# Patient Record
Sex: FEMALE | URBAN - METROPOLITAN AREA
[De-identification: names, ages, dates, MRNs, and addresses within clinical notes are randomized per-mention and may not be internally consistent; named-entity substitution may affect disease eponyms.]

---

## 2019-07-31 ENCOUNTER — APPOINTMENT (RX ONLY)
Dept: URBAN - METROPOLITAN AREA CLINIC 42 | Facility: CLINIC | Age: 49
Setting detail: DERMATOLOGY
End: 2019-07-31

## 2019-07-31 DIAGNOSIS — L90.8 OTHER ATROPHIC DISORDERS OF SKIN: ICD-10-CM

## 2019-07-31 DIAGNOSIS — L57.0 ACTINIC KERATOSIS: ICD-10-CM

## 2019-07-31 DIAGNOSIS — L82.1 OTHER SEBORRHEIC KERATOSIS: ICD-10-CM

## 2019-07-31 DIAGNOSIS — D22 MELANOCYTIC NEVI: ICD-10-CM

## 2019-07-31 DIAGNOSIS — L81.4 OTHER MELANIN HYPERPIGMENTATION: ICD-10-CM

## 2019-07-31 PROBLEM — D22.5 MELANOCYTIC NEVI OF TRUNK: Status: ACTIVE | Noted: 2019-07-31

## 2019-07-31 PROBLEM — K21.9 GASTRO-ESOPHAGEAL REFLUX DISEASE WITHOUT ESOPHAGITIS: Status: ACTIVE | Noted: 2019-07-31

## 2019-07-31 PROBLEM — F32.9 MAJOR DEPRESSIVE DISORDER, SINGLE EPISODE, UNSPECIFIED: Status: ACTIVE | Noted: 2019-07-31

## 2019-07-31 PROCEDURE — 99214 OFFICE O/P EST MOD 30 MIN: CPT

## 2019-07-31 PROCEDURE — ? COUNSELING

## 2019-07-31 PROCEDURE — ? DEFER

## 2019-07-31 PROCEDURE — ? PHOTO-DOCUMENTATION

## 2019-07-31 PROCEDURE — ? ADDITIONAL NOTES

## 2019-07-31 ASSESSMENT — LOCATION DETAILED DESCRIPTION DERM
LOCATION DETAILED: MIDDLE STERNUM
LOCATION DETAILED: LEFT MEDIAL UPPER BACK
LOCATION DETAILED: NASAL DORSUM
LOCATION DETAILED: INFERIOR THORACIC SPINE

## 2019-07-31 ASSESSMENT — LOCATION SIMPLE DESCRIPTION DERM
LOCATION SIMPLE: CHEST
LOCATION SIMPLE: LEFT UPPER BACK
LOCATION SIMPLE: NOSE
LOCATION SIMPLE: UPPER BACK

## 2019-07-31 ASSESSMENT — LOCATION ZONE DERM
LOCATION ZONE: TRUNK
LOCATION ZONE: NOSE

## 2019-07-31 NOTE — PROCEDURE: ADDITIONAL NOTES
Detail Level: Simple
Additional Notes: Discussed in detail melanage peel r/b/s, mini melanage vs. vitalize and rejuvinize peels

## 2019-07-31 NOTE — PROCEDURE: PHOTO-DOCUMENTATION
Details (Free Text): L upper thigh monitor
Detail Level: Zone
Photo Preface (Leave Blank If You Do Not Want): Photographs were obtained today

## 2019-09-04 ENCOUNTER — APPOINTMENT (RX ONLY)
Dept: URBAN - METROPOLITAN AREA CLINIC 42 | Facility: CLINIC | Age: 49
Setting detail: DERMATOLOGY
End: 2019-09-04

## 2019-09-04 DIAGNOSIS — L72.0 EPIDERMAL CYST: ICD-10-CM

## 2019-09-04 DIAGNOSIS — L90.8 OTHER ATROPHIC DISORDERS OF SKIN: ICD-10-CM

## 2019-09-04 DIAGNOSIS — L57.0 ACTINIC KERATOSIS: ICD-10-CM

## 2019-09-04 PROBLEM — D23.39 OTHER BENIGN NEOPLASM OF SKIN OF OTHER PARTS OF FACE: Status: ACTIVE | Noted: 2019-09-04

## 2019-09-04 PROCEDURE — ? LIQUID NITROGEN

## 2019-09-04 PROCEDURE — 99213 OFFICE O/P EST LOW 20 MIN: CPT | Mod: 25

## 2019-09-04 PROCEDURE — ? COUNSELING

## 2019-09-04 PROCEDURE — 17000 DESTRUCT PREMALG LESION: CPT

## 2019-09-04 PROCEDURE — ? ADDITIONAL NOTES

## 2019-09-04 ASSESSMENT — LOCATION SIMPLE DESCRIPTION DERM
LOCATION SIMPLE: RIGHT FOREHEAD
LOCATION SIMPLE: NOSE

## 2019-09-04 ASSESSMENT — LOCATION ZONE DERM
LOCATION ZONE: NOSE
LOCATION ZONE: FACE

## 2019-09-04 ASSESSMENT — LOCATION DETAILED DESCRIPTION DERM
LOCATION DETAILED: NASAL ROOT
LOCATION DETAILED: RIGHT LATERAL FOREHEAD

## 2020-11-03 ENCOUNTER — APPOINTMENT (RX ONLY)
Dept: URBAN - METROPOLITAN AREA CLINIC 42 | Facility: CLINIC | Age: 50
Setting detail: DERMATOLOGY
End: 2020-11-03

## 2020-11-03 VITALS — TEMPERATURE: 98 F

## 2020-11-03 DIAGNOSIS — B35.1 TINEA UNGUIUM: ICD-10-CM

## 2020-11-03 DIAGNOSIS — D485 NEOPLASM OF UNCERTAIN BEHAVIOR OF SKIN: ICD-10-CM

## 2020-11-03 DIAGNOSIS — D22 MELANOCYTIC NEVI: ICD-10-CM

## 2020-11-03 DIAGNOSIS — L81.4 OTHER MELANIN HYPERPIGMENTATION: ICD-10-CM

## 2020-11-03 DIAGNOSIS — L82.1 OTHER SEBORRHEIC KERATOSIS: ICD-10-CM

## 2020-11-03 DIAGNOSIS — Z41.9 ENCOUNTER FOR PROCEDURE FOR PURPOSES OTHER THAN REMEDYING HEALTH STATE, UNSPECIFIED: ICD-10-CM

## 2020-11-03 DIAGNOSIS — D18.0 HEMANGIOMA: ICD-10-CM

## 2020-11-03 PROBLEM — D18.01 HEMANGIOMA OF SKIN AND SUBCUTANEOUS TISSUE: Status: ACTIVE | Noted: 2020-11-03

## 2020-11-03 PROBLEM — D22.5 MELANOCYTIC NEVI OF TRUNK: Status: ACTIVE | Noted: 2020-11-03

## 2020-11-03 PROBLEM — D48.5 NEOPLASM OF UNCERTAIN BEHAVIOR OF SKIN: Status: ACTIVE | Noted: 2020-11-03

## 2020-11-03 PROCEDURE — ? BIOPSY BY SHAVE METHOD

## 2020-11-03 PROCEDURE — ? ADDITIONAL NOTES

## 2020-11-03 PROCEDURE — ? NAIL CLIPPING FOR PAS

## 2020-11-03 PROCEDURE — ? COUNSELING

## 2020-11-03 PROCEDURE — ? BOTOX

## 2020-11-03 PROCEDURE — 11102 TANGNTL BX SKIN SINGLE LES: CPT

## 2020-11-03 PROCEDURE — 99214 OFFICE O/P EST MOD 30 MIN: CPT | Mod: 25

## 2020-11-03 ASSESSMENT — LOCATION ZONE DERM
LOCATION ZONE: FACE
LOCATION ZONE: TRUNK
LOCATION ZONE: TOENAIL

## 2020-11-03 ASSESSMENT — LOCATION SIMPLE DESCRIPTION DERM
LOCATION SIMPLE: RIGHT CHEEK
LOCATION SIMPLE: RIGHT UPPER BACK
LOCATION SIMPLE: LEFT GREAT TOE
LOCATION SIMPLE: INFERIOR FOREHEAD
LOCATION SIMPLE: LEFT UPPER BACK

## 2020-11-03 ASSESSMENT — LOCATION DETAILED DESCRIPTION DERM
LOCATION DETAILED: RIGHT SUPERIOR UPPER BACK
LOCATION DETAILED: LEFT INFERIOR MEDIAL UPPER BACK
LOCATION DETAILED: RIGHT MEDIAL UPPER BACK
LOCATION DETAILED: INFERIOR MID FOREHEAD
LOCATION DETAILED: RIGHT INFERIOR CENTRAL BUCCAL CHEEK
LOCATION DETAILED: LEFT GREAT TOENAIL
LOCATION DETAILED: RIGHT INFERIOR UPPER BACK

## 2020-11-03 NOTE — PROCEDURE: BOTOX
Inferior Lateral Orbicularis Oculi Units: 0
Show Depressor Anguli Units: Yes
Expiration Date (Month Year): 05/23
Detail Level: Detailed
Show Right And Left Periorbital Units: No
Lot #: [id number]
Price (Use Numbers Only, No Special Characters Or $): 288
Post-Care Instructions: Patient instructed to not lie down for 4 hours and limit physical activity for 24 hours. Patient instructed not to travel by airplane for 48 hours.
Additional Area 1 Location: glabella forehead
Consent: Written consent obtained. Risks include but not limited to lid/brow ptosis, bruising, swelling, diplopia, temporary effect, incomplete chemical denervation.
Dilution (U/0.1 Cc): 4
Forehead Units: 32

## 2020-11-03 NOTE — PROCEDURE: ADDITIONAL NOTES
Detail Level: Simple
Additional Notes: Risks benefits and alternatives of oral antifungals discussed \\nNail clipping done today \\nPending labs and clipping, will start oral lamisil \\nS/E and proper use of medications discussed

## 2020-11-03 NOTE — PROCEDURE: BIOPSY BY SHAVE METHOD

## 2021-03-09 ENCOUNTER — APPOINTMENT (RX ONLY)
Dept: URBAN - METROPOLITAN AREA CLINIC 42 | Facility: CLINIC | Age: 51
Setting detail: DERMATOLOGY
End: 2021-03-09

## 2021-03-09 DIAGNOSIS — Z41.9 ENCOUNTER FOR PROCEDURE FOR PURPOSES OTHER THAN REMEDYING HEALTH STATE, UNSPECIFIED: ICD-10-CM | Status: INADEQUATELY CONTROLLED

## 2021-03-09 PROCEDURE — ? ADDITIONAL NOTES

## 2021-03-09 PROCEDURE — ? PALOMAR ICON LASER

## 2021-03-09 NOTE — PROCEDURE: ADDITIONAL NOTES
Render Risk Assessment In Note?: no
Detail Level: Simple
Additional Notes: Discussed LN2 for benign sk, quotes $150 for up to 15 lesions, and Botox \\n\\nPt to fu in a few weeks or so to proceed with tx and to discuss fillers for future treatment in May

## 2021-03-09 NOTE — PROCEDURE: MIPS QUALITY
Additional Notes: Covid 19 questions\\nHave you travelled internationally or travelled on a cruise ship in the last 14 days? No\\nHave you travelled to [place], [place], [place], [place] or [place] in the last 14 days? No\\nHave you been in contact with anyone who is suspected of having, being tested, or has tested positive for the\\ncoronavirus? No\\nHave you been experiencing fever or respiratory symptoms? No\\nHave you experienced GI symptoms - diarrhea, vomiting, nausea, or stomach pains in the last week? No
Detail Level: Detailed
Quality 110: Preventive Care And Screening: Influenza Immunization: Influenza Immunization not Administered because Patient Refused.

## 2021-03-09 NOTE — PROCEDURE: PALOMAR ICON LASER
Number Of Passes: 1
Fluence: 25
Pulse Duration (In Milliseconds): 20
Consent: Written consent obtained, risks reviewed including but not limited to crusting, scabbing, blistering, scarring, darker or lighter pigmentary change, bruising, and/or incomplete response.
Pulse Duration (In Milliseconds): 10
Fluence: 38
Anesthesia Type: 1% lidocaine with epinephrine
Price (Use Numbers Only, No Special Characters Or $): 665
Render Post Care In The Note?: yes
Pre-Procedure Text: The patient's skin was cleaned and ultrasound gel applied.
Post-Care Instructions: I reviewed with the patient in detail post-care instructions. Patient should stay away from the sun and wear sun protection until treated areas are fully healed.
Detail Level: Zone
External Cooling Fan Speed: 5
Pulse Duration (In Milliseconds): 20
Treated Area: large area

## 2021-04-23 ENCOUNTER — APPOINTMENT (RX ONLY)
Dept: URBAN - METROPOLITAN AREA CLINIC 42 | Facility: CLINIC | Age: 51
Setting detail: DERMATOLOGY
End: 2021-04-23

## 2021-04-23 DIAGNOSIS — Z41.9 ENCOUNTER FOR PROCEDURE FOR PURPOSES OTHER THAN REMEDYING HEALTH STATE, UNSPECIFIED: ICD-10-CM

## 2021-04-23 DIAGNOSIS — L82.1 OTHER SEBORRHEIC KERATOSIS: ICD-10-CM

## 2021-04-23 DIAGNOSIS — B35.1 TINEA UNGUIUM: ICD-10-CM | Status: INADEQUATELY CONTROLLED

## 2021-04-23 PROCEDURE — 99213 OFFICE O/P EST LOW 20 MIN: CPT

## 2021-04-23 PROCEDURE — ? PRESCRIPTION

## 2021-04-23 PROCEDURE — ? BOTOX

## 2021-04-23 PROCEDURE — ? ADDITIONAL NOTES

## 2021-04-23 PROCEDURE — ? LIQUID NITROGEN (COSMETIC)

## 2021-04-23 PROCEDURE — ? COUNSELING

## 2021-04-23 RX ORDER — TERBINAFINE HYDROCHLORIDE 250 MG/1
1 TABLET ORAL QD
Qty: 90 | Refills: 0 | Status: ERX | COMMUNITY
Start: 2021-04-23

## 2021-04-23 RX ADMIN — TERBINAFINE HYDROCHLORIDE 1: 250 TABLET ORAL at 00:00

## 2021-04-23 ASSESSMENT — LOCATION ZONE DERM
LOCATION ZONE: TOENAIL
LOCATION ZONE: FACE
LOCATION ZONE: TRUNK

## 2021-04-23 ASSESSMENT — LOCATION SIMPLE DESCRIPTION DERM
LOCATION SIMPLE: RIGHT GREAT TOE
LOCATION SIMPLE: LEFT GREAT TOE
LOCATION SIMPLE: INFERIOR FOREHEAD
LOCATION SIMPLE: RIGHT UPPER BACK
LOCATION SIMPLE: ABDOMEN

## 2021-04-23 ASSESSMENT — LOCATION DETAILED DESCRIPTION DERM
LOCATION DETAILED: RIGHT GREAT TOENAIL
LOCATION DETAILED: RIGHT MEDIAL UPPER BACK
LOCATION DETAILED: LEFT GREAT TOENAIL
LOCATION DETAILED: INFERIOR MID FOREHEAD
LOCATION DETAILED: SUBXIPHOID

## 2021-04-23 NOTE — PROCEDURE: ADDITIONAL NOTES
Detail Level: Simple
Render Risk Assessment In Note?: no
Additional Notes: Pt spoke to nurse on phone from PCP office for recent lab results \\nAST 19\\nALT 22\\nRisks benefits and alternatives of terbinafine discussed

## 2021-04-23 NOTE — PROCEDURE: BOTOX
Inferior Lateral Orbicularis Oculi Units: 0
Show Depressor Anguli Units: Yes
Expiration Date (Month Year): 07/23
Detail Level: Detailed
Show Right And Left Periorbital Units: No
Lot #: [id number]
Price (Use Numbers Only, No Special Characters Or $): 209
Post-Care Instructions: Patient instructed to not lie down for 4 hours and limit physical activity for 24 hours. Patient instructed not to travel by airplane for 48 hours.
Consent: Written consent obtained. Risks include but not limited to lid/brow ptosis, bruising, swelling, diplopia, temporary effect, incomplete chemical denervation.
Dilution (U/0.1 Cc): 4
Forehead Units: 44

## 2021-04-23 NOTE — PROCEDURE: LIQUID NITROGEN (COSMETIC)
Duration Of Freeze Thaw-Cycle (Seconds): 10
Number Of Freeze-Thaw Cycles: 2 freeze-thaw cycles
Post-Care Instructions: I reviewed with the patient in detail post-care instructions. Patient is to wear sunprotection, and avoid picking at any of the treated lesions. Pt may apply Vaseline to crusted or scabbing areas.
Detail Level: Zone
Total Number Of Lesions Treated: 15
Consent: The patient's consent was obtained including but not limited to risks of crusting, scabbing, blistering, scarring, darker or lighter pigmentary change, recurrence, incomplete removal and infection.
Render Post Care In The Note?: yes
Price (Use Numbers Only, No Special Characters Or $): 150

## 2021-11-29 ENCOUNTER — APPOINTMENT (RX ONLY)
Dept: URBAN - METROPOLITAN AREA CLINIC 42 | Facility: CLINIC | Age: 51
Setting detail: DERMATOLOGY
End: 2021-11-29

## 2021-11-29 DIAGNOSIS — D485 NEOPLASM OF UNCERTAIN BEHAVIOR OF SKIN: ICD-10-CM

## 2021-11-29 DIAGNOSIS — L57.8 OTHER SKIN CHANGES DUE TO CHRONIC EXPOSURE TO NONIONIZING RADIATION: ICD-10-CM

## 2021-11-29 DIAGNOSIS — L82.1 OTHER SEBORRHEIC KERATOSIS: ICD-10-CM

## 2021-11-29 DIAGNOSIS — L72.0 EPIDERMAL CYST: ICD-10-CM

## 2021-11-29 PROBLEM — D48.5 NEOPLASM OF UNCERTAIN BEHAVIOR OF SKIN: Status: ACTIVE | Noted: 2021-11-29

## 2021-11-29 PROCEDURE — 11102 TANGNTL BX SKIN SINGLE LES: CPT

## 2021-11-29 PROCEDURE — ? BIOPSY BY SHAVE METHOD

## 2021-11-29 PROCEDURE — 99213 OFFICE O/P EST LOW 20 MIN: CPT | Mod: 25

## 2021-11-29 PROCEDURE — ? COUNSELING

## 2021-11-29 ASSESSMENT — LOCATION SIMPLE DESCRIPTION DERM
LOCATION SIMPLE: LEFT FOREHEAD
LOCATION SIMPLE: LEFT CHEEK
LOCATION SIMPLE: RIGHT FOREHEAD
LOCATION SIMPLE: RIGHT SHOULDER

## 2021-11-29 ASSESSMENT — LOCATION ZONE DERM
LOCATION ZONE: FACE
LOCATION ZONE: ARM

## 2021-11-29 ASSESSMENT — LOCATION DETAILED DESCRIPTION DERM
LOCATION DETAILED: RIGHT ANTERIOR SHOULDER
LOCATION DETAILED: LEFT INFERIOR MEDIAL MALAR CHEEK
LOCATION DETAILED: RIGHT LATERAL FOREHEAD
LOCATION DETAILED: LEFT INFERIOR MEDIAL FOREHEAD